# Patient Record
(demographics unavailable — no encounter records)

---

## 2025-03-18 NOTE — ASSESSMENT
[FreeTextEntry1] : Assessment: - Summary : 33-year-old male presenting with premature ejaculation and erectile dysfunction. History of hypogonadism treated with TRT, now discontinued. Possible contributing factors include psychological pressure from new relationship and potential ongoing hormonal imbalance. - Problems : - Premature ejaculation - Erectile dysfunction - History of hypogonadism   - Plan : - Order testosterone level test - Order scrotal ultrasound to assess testicular size and morphology - Refer to Dr. Barrington Dixon (urologist/andrologist) at [facility] for specialized evaluation and management - Patient to complete ordered tests before specialist appointment - Discuss potential treatments for premature ejaculation and erectile dysfunction with the specialist, including both pharmacological and psychological approaches - Follow up after specialist consultation to review findings and adjust management plan as needed

## 2025-03-18 NOTE — HISTORY OF PRESENT ILLNESS
[FreeTextEntry1] : 33-year-old male presents with concerns of premature ejaculation and difficulty maintaining erections. Patient reports these issues have been ongoing for a long time, worsening recently with a new relationship. - Chief Complaint (CC) : Premature ejaculation and erectile dysfunction - History of Present Illness (HPI) : Patient reports experiencing premature ejaculation during conversations or thoughts about his girlfriend. He also reports difficulty maintaining erections. These symptoms have been present for a long time but have worsened in the past three months since starting a new relationship. Patient notes that five years ago, he had similar issues but was able to regain erections more quickly. Currently, it takes several hours to regain an erection after ejaculation. - Past Medical History : History of low testosterone treated with Testosterone Replacement Therapy (TRT) - Past Surgical History : Bilateral gynecomastia surgery in the past  - Social History : Currently in a new relationship for about three months - Medications : Previously on TRT (testosterone injections) every other day for 1.5-2 years, discontinued 6 months ago   - Diagnostic Results : Previous testosterone levels were low, improved with TRT. Last test was in September/October, taken during the day, not fasting.

## 2025-03-27 NOTE — HISTORY OF PRESENT ILLNESS
[FreeTextEntry1] : 33 year old male presents to the office for a c/o low testosterone. He was seen by Dr Godwin regarding and completed testosterone (317) as well scrotal duplex (normal). He reports today that when he speaks to his partner, and they touch he would have premature ejaculation. He states that he has had this issue with a prior partner as well. He has not tried any treatment plans. He states that he was on TRT (2years) and HCG.  His erections vary in sexual situations  Occasional morning erections

## 2025-05-27 NOTE — HISTORY OF PRESENT ILLNESS
[FreeTextEntry1] : pr reports abnormal levels of stress recently, not had a chance to use to tadalafil   low energy less working out.  post void dribbling, did have prematurure ejaculation episode   previously: 33 year old male presents to the office for a c/o low testosterone. He was seen by Dr Godwin regarding and completed testosterone (317) as well scrotal duplex (normal). He reports today that when he speaks to his partner, and they touch he would have premature ejaculation. He states that he has had this issue with a prior partner as well. He has not tried any treatment plans. He states that he was on TRT (2years) and HCG.  His erections vary in sexual situations  Occasional morning erections